# Patient Record
Sex: FEMALE | Race: BLACK OR AFRICAN AMERICAN | NOT HISPANIC OR LATINO | Employment: UNEMPLOYED | URBAN - METROPOLITAN AREA
[De-identification: names, ages, dates, MRNs, and addresses within clinical notes are randomized per-mention and may not be internally consistent; named-entity substitution may affect disease eponyms.]

---

## 2020-02-27 PROBLEM — Z00.00 WELL WOMAN EXAM WITHOUT GYNECOLOGICAL EXAM: Status: ACTIVE | Noted: 2020-02-27

## 2020-02-27 PROBLEM — Z12.31 BREAST CANCER SCREENING BY MAMMOGRAM: Status: ACTIVE | Noted: 2020-02-27

## 2020-06-01 PROBLEM — Z00.00 WELL WOMAN EXAM WITHOUT GYNECOLOGICAL EXAM: Status: RESOLVED | Noted: 2020-02-27 | Resolved: 2020-06-01

## 2021-02-26 PROBLEM — K80.20 CALCULUS OF GALLBLADDER WITHOUT CHOLECYSTITIS WITHOUT OBSTRUCTION: Status: ACTIVE | Noted: 2019-07-31

## 2021-02-26 PROBLEM — K80.50 BILIARY COLIC: Status: ACTIVE | Noted: 2019-08-28

## 2021-05-12 ENCOUNTER — PATIENT MESSAGE (OUTPATIENT)
Dept: RESEARCH | Facility: HOSPITAL | Age: 71
End: 2021-05-12

## 2021-07-01 ENCOUNTER — PATIENT MESSAGE (OUTPATIENT)
Dept: ADMINISTRATIVE | Facility: OTHER | Age: 71
End: 2021-07-01

## 2022-11-06 PROBLEM — E87.1 HYPONATREMIA: Status: ACTIVE | Noted: 2022-11-06

## 2022-11-06 PROBLEM — M75.111 NONTRAUMATIC INCOMPLETE TEAR OF RIGHT ROTATOR CUFF: Status: ACTIVE | Noted: 2022-11-06

## 2022-11-06 PROBLEM — E87.6 HYPOKALEMIA: Status: ACTIVE | Noted: 2022-11-06

## 2022-11-06 PROBLEM — R52 INTRACTABLE PAIN: Status: ACTIVE | Noted: 2022-11-06

## 2022-11-06 PROBLEM — R10.9 ACUTE ABDOMINAL PAIN: Status: ACTIVE | Noted: 2022-11-06

## 2022-11-08 PROBLEM — K83.1 COMMON BILE DUCT (CBD) STRICTURE: Status: ACTIVE | Noted: 2022-11-08

## 2022-11-08 PROBLEM — R11.2 INTRACTABLE NAUSEA AND VOMITING: Status: ACTIVE | Noted: 2022-11-08

## 2022-11-08 PROBLEM — K83.8 COMMON BILE DUCT DILATATION: Status: ACTIVE | Noted: 2022-11-08

## 2022-11-09 PROBLEM — R93.3 ABNORMAL MAGNETIC RESONANCE CHOLANGIOPANCREATOGRAPHY (MRCP): Status: ACTIVE | Noted: 2022-11-09

## 2022-11-10 PROBLEM — R11.2 INTRACTABLE NAUSEA AND VOMITING: Status: RESOLVED | Noted: 2022-11-08 | Resolved: 2022-11-10

## 2022-11-11 ENCOUNTER — NURSE TRIAGE (OUTPATIENT)
Dept: ADMINISTRATIVE | Facility: CLINIC | Age: 72
End: 2022-11-11

## 2022-11-11 NOTE — TELEPHONE ENCOUNTER
Post discharge tracker day 1 call.    Pt said she did not have any symptoms at this time and declined the need for triage. Pt informed to call her doctors office for clearance to go back to work. Invited Pt to call ooc at 1 [phone] if she has any new or worsening symptoms or questions. Pt had no additional questions at this time.    No [facility] pcp listed to route encounter to at this time.Gi doc listed on chart is not a valid candidate for routing encounter to at this time.  Reason for Disposition   Information only question and nurse able to answer    Protocols used: Information Only Call - No Triage-A-[facility]